# Patient Record
Sex: FEMALE | Race: OTHER
[De-identification: names, ages, dates, MRNs, and addresses within clinical notes are randomized per-mention and may not be internally consistent; named-entity substitution may affect disease eponyms.]

---

## 2018-12-20 ENCOUNTER — HOSPITAL ENCOUNTER (EMERGENCY)
Dept: HOSPITAL 36 - ER | Age: 23
Discharge: HOME | End: 2018-12-20
Payer: MEDICAID

## 2018-12-20 DIAGNOSIS — B34.9: ICD-10-CM

## 2018-12-20 DIAGNOSIS — J20.8: Primary | ICD-10-CM

## 2018-12-20 LAB
FLUAV AG NPH QL IA: (no result)
FLUBV AG NPH QL IA: (no result)

## 2018-12-20 PROCEDURE — Z7502: HCPCS

## 2018-12-20 NOTE — ED PHYSICIAN CHART
ED Chief Complaint/HPI





- Patient Information


Date Seen:: 12/20/18


Time Seen:: 19:10


Chief Complaint:: cough


History of Present Illness:: 


Patient has had a nonproductive cough for 2 days and a subjective fever earlier 

today.  No vomiting or diarrhea.  Patient denies myalgia.  Patient did not 

receive influenza vaccination this season.


Allergies:: 


 Allergies











Allergy/AdvReac Type Severity Reaction Status Date / Time


 


No Known Allergies Allergy   Verified 12/20/18 19:06











Vitals:: 


 Vital Signs - 8 hr











  12/20/18





  19:00


 


Temp 99.8 F


 


HR 93


 


RR 18


 


/60


 


O2 Sat % 97











Historian:: Patient


Review:: Nurse's Note Reviewed





ED Review of Systems





- Review of Systems


General/Constitutional: Fever


Skin: No skin lesions


Head: No headache


Eyes: Acuity change


ENT: No earache, No sore throat, No tinnitus


Neck: No neck pain


Cardio Vascular: No chest pain, No palpitations


Pulmonary: Cough, No sputum


G/U: No dysuria


Musculoskeletal: No bone or joint pain


Endocrine: No polyuria


Psychiatric: No prior psych history





ED Past Medical History





- Past Medical History


Past Medical History: No significant medical hx


Family History: Diabetes Melitus


Social History: Non Smoker, No Alcohol


Surgical History: None


Psychiatricy History: None


Medication: None





Family Medical History





- Family Member


  ** Mother


Age: 42


Ethnicity: 


Living Status: Still Living


Hx Family Stroke: Yes





ED Physical Exam





- Physical Examination


General/Constitutional: Awake, Well-developed, well-nourished, Alert, No 

distress, GCS 15, Non-toxic appearing, Ambulatory


Other Gen/Cons comments:: 





Easy, unlabored respirations


Head: Atraumatic


Eyes: Lids, conjuctiva normal, PERRL, EOMI


Skin: Nl inspection, No rash, No skin lesions, No ecchymosis, Well hydrated, No 

lymphadenopathy


ENMT: External ears, nose nl, Nasal exam nl, Lips, teeth, gums nl


Neck: Nontender, Full ROM w/o pain, No JVD, No nuchal rigidity, No bruit, No 

mass, No stridor


Respiratory: Nl effort/Exclusion, Clear to Auscultation, No Wheeze/Rhonchi/Rales


Cardio Vascular: RRR, No murmur, gallop, rubs, NL S1 S2


GI: No tenderness/rebounding/guarding, No organomegaly, No hernia, Normal BS's, 

Nondistended, No mass/bruits, No McBurney tenderness


: No CVA tenderness


Extremities: No tenderness or effusion, Full ROM, normal strength in all 

extremities, No edema, Normal digits & nails


Neuro/Psych: Alert/oriented, DTR's symmetric, Normal sensory exam, Normal motor 

strength, Judgement/insight normal, Mood normal, Normal gait, No focal deficits


Misc: Normal back, No paraspinal tenderness





ED Assessment





- Assessment


General Assessment: 





Acute viral syndrome; viral bronchitis





ED Septic Shock





- .


Is Septic Shock (SBP<90, OR Lactate>4 mmol\L) present?: No





- <6hrs of presentation:


Vital Signs: 


 Vital Signs - 8 hr











  12/20/18





  19:00


 


Temp 99.8 F


 


HR 93


 


RR 18


 


/60


 


O2 Sat % 97














ED Reassessment (Disposition)





- Reassessment


Reassessment Condition:: Unchanged





- Diagnosis


Diagnosis:: 





Acute viral syndrome; viral bronchitis





- Aftercare/Follow up Instructions


Aftercare/Follow-Up Instructions:: Refer to Discharge Instructions





- Patient Disposition


Discharge/Transfer:: Home


Condition at Disposition:: Stable, Unchanged

## 2019-01-06 ENCOUNTER — HOSPITAL ENCOUNTER (EMERGENCY)
Dept: HOSPITAL 36 - ER | Age: 24
Discharge: HOME | End: 2019-01-06
Payer: MEDICAID

## 2019-01-06 DIAGNOSIS — J40: Primary | ICD-10-CM

## 2019-01-06 DIAGNOSIS — R05: ICD-10-CM

## 2019-01-06 PROCEDURE — Z7502: HCPCS

## 2019-01-06 NOTE — ED PHYSICIAN CHART
ED Chief Complaint/HPI





- Patient Information


Date Seen:: 01/06/19


Time Seen:: 19:20


Chief Complaint:: cough


History of Present Illness:: 





23 yr old female here with cough not going away after being seen here by DR CARDENAS NO ASTHMA NO SOB OR CP NO N V


Allergies:: 


 Allergies











Allergy/AdvReac Type Severity Reaction Status Date / Time


 


No Known Allergies Allergy   Verified 12/20/18 19:06











Vitals:: 


 Vital Signs - 8 hr











  01/06/19





  19:15


 


Temp 98.4 F


 


HR 96


 


RR 17


 


/32


 


O2 Sat % 97














ED Review of Systems





- Review of Systems


General/Constitutional: No fever, No chills, No weight loss, No weakness, No 

diaphoresis, No edema, No loss of appetite


Skin: No skin lesions, No rash, No bruising


Head: No headache, No light-headedness


Eyes: No loss of vision, No pain, No diplopia


ENT: No earache, No nasal drainage, No sore throat, No tinnitus


Neck: No neck pain, No swelling, No thyromegaly, No stiffness, No mass noted


Cardio Vascular: No chest pain, No palpitations, No PND, No orthopnea, No edema


Pulmonary: Cough


GI: No nausea, No vomiting, No diarrhea, No pain, No melena, No hematochezia, 

No constipation, No hematemesis


G/U: No dysuria, No frequency, No hematuria


Musculoskeletal: No bone or joint pain, No back pain, No muscle pain


Endocrine: No polyuria, No polydipsia


Psychiatric: No prior psych history, No depression, No anxiety, No suicidal 

ideation


Hematopoietic: No bruising, No lymphadenopathy


Allergic/Immuno: No urticaria, No angioedema


Neurological: No syncope, No focal symptoms, No weakness, No paresthesia, No 

headache, No seizure, No dizziness, No confusion, No vertigo





ED Past Medical History





- Past Medical History


Past Medical History: No significant medical hx





Family Medical History





- Family Member


  ** Mother


History Unknown: Yes


Ethnicity: 


Living Status: Still Living


Hx Family Stroke: Yes





ED Physical Exam





- Physical Examination


General/Constitutional: Awake, Well-developed, well-nourished, Alert, No 

distress, GCS 15, Non-toxic appearing, Ambulatory


Head: Atraumatic


Eyes: Lids, conjuctiva normal, PERRL, EOMI


Skin: Nl inspection, No rash, No skin lesions, No ecchymosis, Well hydrated, No 

lymphadenopathy


ENMT: External ears, nose nl, Nasal exam nl, Lips, teeth, gums nl


Neck: Nontender, Full ROM w/o pain, No JVD, No nuchal rigidity, No bruit, No 

mass, No stridor


Respiratory: Nl effort/Exclusion, Clear to Auscultation, No Wheeze/Rhonchi/Rales


Cardio Vascular: RRR, No murmur, gallop, rubs, NL S1 S2


GI: No tenderness/rebounding/guarding, No organomegaly, No hernia, Normal BS's, 

Nondistended, No mass/bruits, No McBurney tenderness


: No CVA tenderness


Extremities: No tenderness or effusion, Full ROM, normal strength in all 

extremities, No edema, Normal digits & nails


Neuro/Psych: Alert/oriented, DTR's symmetric, Normal sensory exam, Normal motor 

strength, Judgement/insight normal, Mood normal, Normal gait, No focal deficits


Misc: Normal back, No paraspinal tenderness





ED Assessment





- Assessment


General Assessment: 





COUGH





ED Septic Shock





- .


Is Septic Shock (SBP<90, OR Lactate>4 mmol\L) present?: No





- <6hrs of presentation:


Vital Signs: 


 Vital Signs - 8 hr











  01/06/19





  19:15


 


Temp 98.4 F


 


HR 96


 


RR 17


 


/32


 


O2 Sat % 97














ED Reassessment (Disposition)





- Reassessment


Reassessment:: 





COUGH BRONCHITIS





- Diagnosis


Diagnosis:: 





COUGH





- Aftercare/Follow up Instructions


Aftercare/Follow-Up Instructions:: Counseled pt regarding lab results/diagnosis 

& need follow up





- Patient Disposition


Discharge/Transfer:: Home


Condition at Disposition:: Stable